# Patient Record
Sex: MALE | Race: WHITE
[De-identification: names, ages, dates, MRNs, and addresses within clinical notes are randomized per-mention and may not be internally consistent; named-entity substitution may affect disease eponyms.]

---

## 2019-10-17 ENCOUNTER — HOSPITAL ENCOUNTER (EMERGENCY)
Dept: HOSPITAL 63 - ER | Age: 44
Discharge: HOME | End: 2019-10-17
Payer: COMMERCIAL

## 2019-10-17 VITALS — DIASTOLIC BLOOD PRESSURE: 81 MMHG | SYSTOLIC BLOOD PRESSURE: 139 MMHG

## 2019-10-17 DIAGNOSIS — Z00.8: Primary | ICD-10-CM

## 2019-10-17 DIAGNOSIS — K21.9: ICD-10-CM

## 2019-10-17 DIAGNOSIS — F41.9: ICD-10-CM

## 2019-10-17 DIAGNOSIS — E11.9: ICD-10-CM

## 2019-10-17 DIAGNOSIS — I10: ICD-10-CM

## 2019-10-17 DIAGNOSIS — R00.0: ICD-10-CM

## 2019-10-17 LAB
ALBUMIN SERPL-MCNC: 4.9 G/DL (ref 3.4–5)
ALBUMIN/GLOB SERPL: 1.5 {RATIO} (ref 1–1.7)
ALP SERPL-CCNC: 58 U/L (ref 46–116)
ALT SERPL-CCNC: 37 U/L (ref 16–63)
ANION GAP SERPL CALC-SCNC: 18 MMOL/L (ref 6–14)
AST SERPL-CCNC: 30 U/L (ref 15–37)
BASOPHILS # BLD AUTO: 0 X10^3/UL (ref 0–0.2)
BASOPHILS NFR BLD: 1 % (ref 0–3)
BILIRUB SERPL-MCNC: 0.5 MG/DL (ref 0.2–1)
BUN/CREAT SERPL: 16 (ref 6–20)
CA-I SERPL ISE-MCNC: 14 MG/DL (ref 8–26)
CALCIUM SERPL-MCNC: 9.5 MG/DL (ref 8.5–10.1)
CHLORIDE SERPL-SCNC: 102 MMOL/L (ref 98–107)
CO2 SERPL-SCNC: 22 MMOL/L (ref 21–32)
CREAT SERPL-MCNC: 0.9 MG/DL (ref 0.7–1.3)
EOSINOPHIL NFR BLD: 0.2 X10^3/UL (ref 0–0.7)
EOSINOPHIL NFR BLD: 2 % (ref 0–3)
ERYTHROCYTE [DISTWIDTH] IN BLOOD BY AUTOMATED COUNT: 14 % (ref 11.5–14.5)
GFR SERPLBLD BASED ON 1.73 SQ M-ARVRAT: 91.3 ML/MIN
GLOBULIN SER-MCNC: 3.3 G/DL (ref 2.2–3.8)
GLUCOSE SERPL-MCNC: 140 MG/DL (ref 70–99)
HCT VFR BLD CALC: 47 % (ref 39–53)
HGB BLD-MCNC: 15.8 G/DL (ref 13–17.5)
LYMPHOCYTES # BLD: 2.4 X10^3/UL (ref 1–4.8)
LYMPHOCYTES NFR BLD AUTO: 24 % (ref 24–48)
MAGNESIUM SERPL-MCNC: 1.7 MG/DL (ref 1.8–2.4)
MCH RBC QN AUTO: 32 PG (ref 25–35)
MCHC RBC AUTO-ENTMCNC: 34 G/DL (ref 31–37)
MCV RBC AUTO: 95 FL (ref 79–100)
MONO #: 0.9 X10^3/UL (ref 0–1.1)
MONOCYTES NFR BLD: 9 % (ref 0–9)
NEUT #: 6.6 X10^3UL (ref 1.8–7.7)
NEUTROPHILS NFR BLD AUTO: 65 % (ref 31–73)
PLATELET # BLD AUTO: 216 X10^3/UL (ref 140–400)
POTASSIUM SERPL-SCNC: 4.1 MMOL/L (ref 3.5–5.1)
PROT SERPL-MCNC: 8.2 G/DL (ref 6.4–8.2)
RBC # BLD AUTO: 4.97 X10^6/UL (ref 4.3–5.7)
SODIUM SERPL-SCNC: 142 MMOL/L (ref 136–145)
WBC # BLD AUTO: 10.1 X10^3/UL (ref 4–11)

## 2019-10-17 PROCEDURE — 82553 CREATINE MB FRACTION: CPT

## 2019-10-17 PROCEDURE — 85025 COMPLETE CBC W/AUTO DIFF WBC: CPT

## 2019-10-17 PROCEDURE — 93005 ELECTROCARDIOGRAM TRACING: CPT

## 2019-10-17 PROCEDURE — 80053 COMPREHEN METABOLIC PANEL: CPT

## 2019-10-17 PROCEDURE — 99285 EMERGENCY DEPT VISIT HI MDM: CPT

## 2019-10-17 PROCEDURE — 83735 ASSAY OF MAGNESIUM: CPT

## 2019-10-17 PROCEDURE — 84484 ASSAY OF TROPONIN QUANT: CPT

## 2019-10-17 PROCEDURE — 36415 COLL VENOUS BLD VENIPUNCTURE: CPT

## 2019-10-17 PROCEDURE — 82947 ASSAY GLUCOSE BLOOD QUANT: CPT

## 2019-10-17 NOTE — EKG
[facility] [address], [place] [address]

Test Date:    2019-10-17               Test Time:    18:00:25

Pat Name:     DERICK GARCIA          Department:   

Patient ID:   [id number]           Room:          

Gender:       M                        Technician:   

:          14               Requested By: BEBO RICHARDS

Order Number: [id number]            Reading MD:     

                                 Measurements

Intervals                              Axis          

Rate:         124                      P:            65

NC:           142                      QRS:          -51

QRSD:         98                       T:            79

QT:           298                                    

QTc:          432                                    

                           Interpretive Statements

SINUS TACHYCARDIA

LEFT ATRIAL ABNORMALITY

ABNORMAL LEFT AXIS DEVIATION

R-S TRANSITION ZONE IN V LEADS DISPLACED TO THE RIGHT

LEFT ANTERIOR FASCICULAR BLOCK

INCOMPLETE RIGHT BUNDLE BRANCH BLOCK

T ABNORMALITY IN HIGH LATERAL LEADS

ABNORMAL ECG

RI6.01

No previous ECG available for comparison

## 2019-10-17 NOTE — PHYS DOC
Past History


Past Medical History:  Diabetes, GERD, Hypertension


Past Surgical History:  No Surgical History


Smoking:  Non-smoker


Alcohol Use:  Occasionally


Drug Use:  None





Adult General


Chief Complaint


Chief Complaint:  MEDICAL CLEARANCE





HPI


HPI





44-year-old male presents in police custody requesting medical clearance for 

incarceration. Patient reports some increased anxiety. Denies other complaint 

this time. Patient does have a history of diabetes and high blood pressure. 

Denies fever or chills.





Review of Systems


Review of Systems





Constitutional: Denies fever or chills 


Eyes: Denies redness or eye pain 


HENT: Denies nasal congestion or sore throat


Respiratory: Denies cough or shortness of breath 


Cardiovascular: Denies chest pain or palpitations


GI: Denies abdominal pain, nausea, or vomiting


: Denies dysuria or hematuria


Musculoskeletal: Denies back pain or joint pain


Integument: Denies rash or skin lesions 


Neurologic: Denies headache, focal weakness or sensory changes


Psychiatric: Reports anxiety; denies suicidal ideation





Complete systems were reviewed and found to be within normal limits, except as 

documented in this note.





Physical Exam


Physical Exam





Constitutional: Well developed, well nourished, no acute distress, non-toxic 

appearance


HENT: Normocephalic, atraumatic, oropharynx moist


Eyes: PERRL, EOMI, conjunctiva normal, no discharge, horizontal nystagmus noted


Neck: Normal range of motion, no tenderness, supple


Cardiovascular: Heart rate tachycardic, regular rhythm


Lungs & Thorax:  Bilateral breath sounds clear to auscultation, no wheezing


Skin: Warm, dry, no erythema, no rash


Extremities: No tenderness, ROM intact, no edema


Neurologic: Alert and oriented X 3, no focal deficits noted


Psychologic: Affect anxious, judgement normal





EKG


EKG


@1800 Sinus tachycardia at 124bpm, NO ST elevation, QRS 98ms, QT/QTc 298/432ms, 

incomplete RBBB, LAFB





Radiology/Procedures


Radiology/Procedures


[]





Course & Med Decision Making


Course & Med Decision Making


Pertinent Lab studies reviewed. (See chart for details)





Patient presents in police custody for medical clearance. Patient noted to be 

significantly tachycardic upon arrival. Patient does report some anxiety. EKG 

with sinus tachycardia. Labs obtained and posted to chart. IV fluid hydration 

given. Heart rate with interval improvement.





Patient stable for discharge with outpatient follow-up with PCP. Discussed 

findings and plan with patient, who acknowledges understanding and agreement.





Dragon Disclaimer


Dragon Disclaimer


This electronic medical record was generated, in whole or in part, using a voice

 recognition dictation system.





Departure


Departure:


Impression:  


   Primary Impression:  


   Encounter for medical screening examination


   Additional Impression:  


   Tachycardia


Disposition:  01 HOME, SELF-CARE


Condition:  IMPROVED


Patient Instructions:  Nonspecific Tachycardia





Problem Qualifiers











BEBO RICHARDS DO             Oct 17, 2019 17:23